# Patient Record
Sex: MALE | URBAN - METROPOLITAN AREA
[De-identification: names, ages, dates, MRNs, and addresses within clinical notes are randomized per-mention and may not be internally consistent; named-entity substitution may affect disease eponyms.]

---

## 2024-06-14 ENCOUNTER — APPOINTMENT (RX ONLY)
Dept: URBAN - METROPOLITAN AREA CLINIC 47 | Facility: CLINIC | Age: 54
Setting detail: DERMATOLOGY
End: 2024-06-14

## 2024-06-14 DIAGNOSIS — L98.8 OTHER SPECIFIED DISORDERS OF THE SKIN AND SUBCUTANEOUS TISSUE: ICD-10-CM

## 2024-06-14 PROCEDURE — ? DYSPORT (U OR CC)

## 2024-06-14 PROCEDURE — ? PHOTO-DOCUMENTATION

## 2024-06-14 NOTE — PROCEDURE: DYSPORT (U OR CC)
Post-Care Instructions: Patient instructed to not lie down for 4 hours and limit physical activity for 24 hours. No alcohol for 24 hrs. Advised patient to take antihistamines for 3-5 days.
Glabellar Complex Units: 15
Lateral Platysmal Bands Units: 0
Forehead Units: 22
Additional Comments: [id number]
Price (Use Numbers Only, No Special Characters Or $): 510
Inferior Lateral Orbicularis Oculi Units: 14
Lot #: [id number]
Consent: Written consent obtained. Risks include but not limited to lid/brow ptosis, bruising, swelling, diplopia, temporary effect, incomplete chemical denervation.
Additional Area 1 Location: lip flip
Dilution (U/0.1 Cc): 3
Expiration Date (Month Year): 12/25
Detail Level: Detailed

## 2024-06-25 ENCOUNTER — APPOINTMENT (RX ONLY)
Dept: URBAN - METROPOLITAN AREA CLINIC 47 | Facility: CLINIC | Age: 54
Setting detail: DERMATOLOGY
End: 2024-06-25

## 2024-06-25 DIAGNOSIS — Z41.9 ENCOUNTER FOR PROCEDURE FOR PURPOSES OTHER THAN REMEDYING HEALTH STATE, UNSPECIFIED: ICD-10-CM

## 2024-06-25 PROCEDURE — ? COSMETIC FOLLOW-UP

## 2024-06-25 NOTE — PROCEDURE: COSMETIC FOLLOW-UP
Global Improvement: Very Good
Treatment (Optional): Dysport
Patient Satisfaction: Very pleased
Detail Level: Zone

## 2024-08-02 ENCOUNTER — APPOINTMENT (RX ONLY)
Dept: URBAN - METROPOLITAN AREA CLINIC 47 | Facility: CLINIC | Age: 54
Setting detail: DERMATOLOGY
End: 2024-08-02

## 2024-08-02 DIAGNOSIS — Z41.9 ENCOUNTER FOR PROCEDURE FOR PURPOSES OTHER THAN REMEDYING HEALTH STATE, UNSPECIFIED: ICD-10-CM

## 2024-08-02 DIAGNOSIS — L98.8 OTHER SPECIFIED DISORDERS OF THE SKIN AND SUBCUTANEOUS TISSUE: ICD-10-CM

## 2024-08-02 PROCEDURE — ? ADDITIONAL NOTES

## 2024-08-02 PROCEDURE — ? COSMETIC FOLLOW-UP

## 2024-08-02 PROCEDURE — ? BOTOX (U OR CC)

## 2024-08-02 PROCEDURE — ? PHOTO-DOCUMENTATION

## 2024-08-02 NOTE — PROCEDURE: COSMETIC FOLLOW-UP
Global Improvement: None
Treatment (Optional): Dysport
Patient Satisfaction: Dissatisfied
Detail Level: Zone

## 2024-08-02 NOTE — PROCEDURE: ADDITIONAL NOTES
Additional Notes: No charge. Botox used instead due to no improvement with dysport
Detail Level: Simple
Render Risk Assessment In Note?: no

## 2024-08-02 NOTE — PROCEDURE: BOTOX (U OR CC)
Additional Area 6 Units: 0
Dilution (U/0.1 Cc): 2.5
Additional Area 2 Location: lip flip
Document As Units Or Cc?: units
Post-Care Instructions: Patient instructed to not lie down for 4 hours and limit physical activity for 24 hours.
Glabellar Complex Units: 15
Expiration Date (Month Year): 6/26
Forehead Units/Cc: 22
Detail Level: Detailed
Inferior Lateral Orbicularis Oculi Units: 14
Lot #: [id number]
Consent: Written consent obtained. Risks include but not limited to lid/brow ptosis, bruising, swelling, diplopia, temporary effect, incomplete chemical denervation.
Including Pricing Information In The Note: No
Additional Area 1 Location: chin

## 2025-04-15 ENCOUNTER — APPOINTMENT (OUTPATIENT)
Dept: URBAN - METROPOLITAN AREA CLINIC 47 | Facility: CLINIC | Age: 55
Setting detail: DERMATOLOGY
End: 2025-04-15

## 2025-04-15 DIAGNOSIS — L98.8 OTHER SPECIFIED DISORDERS OF THE SKIN AND SUBCUTANEOUS TISSUE: ICD-10-CM

## 2025-04-15 PROCEDURE — ? PHOTO-DOCUMENTATION

## 2025-04-15 PROCEDURE — ? DYSPORT (U OR CC)

## 2025-04-15 NOTE — PROCEDURE: DYSPORT (U OR CC)
Levator Labii Superioris Units: 0
Inferior Lateral Orbicularis Oculi Units: 54
Glabellar Complex Units: 60
Price (Use Numbers Only, No Special Characters Or $): 015
Consent: Written consent obtained. Risks include but not limited to lid/brow ptosis, bruising, swelling, diplopia, temporary effect, incomplete chemical denervation.
Additional Area 1 Location: lip flip
Detail Level: Detailed
Expiration Date (Month Year): 10/26
Additional Comments: Minus $50 xp, minus $20 aspire
Dilution (U/0.1 Cc): 3
Lot #: [id number]
Forehead Units: 66
Post-Care Instructions: Patient instructed to not lie down for 4 hours and limit physical activity for 24 hours. No alcohol for 24 hrs. Advised patient to take antihistamines for 3-5 days.

## 2025-05-16 ENCOUNTER — APPOINTMENT (OUTPATIENT)
Dept: URBAN - METROPOLITAN AREA CLINIC 47 | Facility: CLINIC | Age: 55
Setting detail: DERMATOLOGY
End: 2025-05-16

## 2025-05-16 DIAGNOSIS — Z41.9 ENCOUNTER FOR PROCEDURE FOR PURPOSES OTHER THAN REMEDYING HEALTH STATE, UNSPECIFIED: ICD-10-CM

## 2025-05-16 PROCEDURE — ? INMODE MORPHEUS 8

## 2025-05-16 PROCEDURE — ? PHOTO-DOCUMENTATION

## 2025-05-16 ASSESSMENT — LOCATION ZONE DERM
LOCATION ZONE: NECK
LOCATION ZONE: FACE
LOCATION ZONE: NOSE

## 2025-05-16 ASSESSMENT — LOCATION DETAILED DESCRIPTION DERM
LOCATION DETAILED: LEFT CENTRAL LATERAL NECK
LOCATION DETAILED: LEFT CHIN
LOCATION DETAILED: RIGHT CENTRAL LATERAL NECK
LOCATION DETAILED: SUBMENTAL CHIN
LOCATION DETAILED: RIGHT INFERIOR CENTRAL MALAR CHEEK
LOCATION DETAILED: RIGHT CENTRAL MALAR CHEEK
LOCATION DETAILED: RIGHT SUPERIOR CENTRAL BUCCAL CHEEK
LOCATION DETAILED: LEFT INFERIOR CENTRAL MALAR CHEEK
LOCATION DETAILED: LEFT CENTRAL BUCCAL CHEEK
LOCATION DETAILED: LEFT LATERAL MALAR CHEEK
LOCATION DETAILED: INFERIOR MID FOREHEAD
LOCATION DETAILED: NASAL DORSUM

## 2025-05-16 ASSESSMENT — LOCATION SIMPLE DESCRIPTION DERM
LOCATION SIMPLE: LEFT CHEEK
LOCATION SIMPLE: SUBMENTAL CHIN
LOCATION SIMPLE: RIGHT CHEEK
LOCATION SIMPLE: NOSE
LOCATION SIMPLE: INFERIOR FOREHEAD
LOCATION SIMPLE: CHIN
LOCATION SIMPLE: NECK

## 2025-05-16 NOTE — PROCEDURE: INMODE MORPHEUS 8
Energy Level (Pass 6): 1
Additional Notes: [facility] charge neck only $450 plus $50 pronox plus post care $123.12
Render Generator Setting?: Yes
Detail Level: Zone
Add Another Pass: No
Number Of Pins (Pass 9): 24
Price (Use Numbers Only, No Special Characters Or $): 193.12
Mode (Pass 4): Fixed
Post-Care Instructions: I reviewed with the patient in detail post-care instructions. Patient should stay away from the sun and wear sun protection until treated areas are fully healed.
Consent: Written consent obtained, risks reviewed including but not limited to crusting, scabbing, blistering, scarring, darker or lighter pigmentary change, and/or incomplete removal.
Immediate Post-Procedure Findings: no edema or erythema
Tip (Pass 9): 7.5mm
Mode (Pass 2): Cycle
Treatment Endpoint: visual tightening
Energy Level: 30
Additional Anesthesia Volume In Cc: 6
Depth (Pass 3): 5
Depth In Mm: 0.5
Depth (Pass 2): 4
Anesthesia Type: 1% lidocaine with epinephrine
Tip: 24 pin face
Post-Procedure Text: Vinegar water bath and ice applied. Post care reviewed with patient.
Location Override (Optional): face neck #1 no pkg
Pre-Op Text: The patient identified the areas which were most problematic. These areas were then evaluated and appropriate handpiece and generator settings were chosen. The treatment area was then cleaned and a coupling gel was applied to the treatment area prior to starting the procedure.

## 2025-07-30 ENCOUNTER — APPOINTMENT (OUTPATIENT)
Dept: URBAN - METROPOLITAN AREA CLINIC 47 | Facility: CLINIC | Age: 55
Setting detail: DERMATOLOGY
End: 2025-07-30

## 2025-07-30 DIAGNOSIS — L98.8 OTHER SPECIFIED DISORDERS OF THE SKIN AND SUBCUTANEOUS TISSUE: ICD-10-CM

## 2025-07-30 PROCEDURE — ? PHOTO-DOCUMENTATION

## 2025-07-30 PROCEDURE — ? DYSPORT (U OR CC)

## 2025-07-30 NOTE — PROCEDURE: DYSPORT (U OR CC)
Lateral Platysmal Bands Units: 0
Expiration Date (Month Year): 1/27
Post-Care Instructions: Patient instructed to not lie down for 4 hours and limit physical activity for 24 hours. No alcohol for 24 hrs. Advised patient to take antihistamines for 3-5 days.
Glabellar Complex Units: 60
Dilution (U/0.1 Cc): 3
Forehead Units: 66
Price (Use Numbers Only, No Special Characters Or $): 169
Consent: Written consent obtained. Risks include but not limited to lid/brow ptosis, bruising, swelling, diplopia, temporary effect, incomplete chemical denervation.
Additional Area 1 Location: lip flip
Detail Level: Detailed
Inferior Lateral Orbicularis Oculi Units: 54
Lot #: [id number]
[id number]